# Patient Record
Sex: MALE | Race: WHITE | NOT HISPANIC OR LATINO | URBAN - NONMETROPOLITAN AREA
[De-identification: names, ages, dates, MRNs, and addresses within clinical notes are randomized per-mention and may not be internally consistent; named-entity substitution may affect disease eponyms.]

---

## 2018-08-28 NOTE — PROCEDURE NOTE: CLINICAL
Prior to treatment, the risks/benefits/alternatives were discussed. The patient wished to proceed with procedure. Local anesthetic was given. The eyelid was prepped and draped for procedure. The lesion was incised and removed. The wound was repaired with sutures. Patient tolerated procedure well. There were no complications. Post procedure instructions given.

## 2019-05-24 NOTE — PATIENT DISCUSSION
IRIS NEVI OU - NO PERSONAL HX MELANOMA. +FH (mom) OF CHOROIDAL MELANOMA. DISCUSSED RISK OF TRANSFORMATION OF IRIS NEVUS TO MELANOMA. FOLLOW CLOSELY.  PHOTOS UNDILATED AT FU

## 2019-05-24 NOTE — PATIENT DISCUSSION
OCULAR MIGRAINE WITH HEADACHE (SCINTILLATING SCOTOMA):  AVOID PRECIPITATING TRIGGERS. CONSIDER 400MG-500MG MAGNESIUM SUPPLEMENTS AT BEDTIME. CALL BACK IMMEDIATELY FOR ANY WORSENING SYMPTOMS. STABLE PER PATIENT. FOLLOW.

## 2021-09-27 ENCOUNTER — IMPORTED ENCOUNTER (OUTPATIENT)
Dept: URBAN - NONMETROPOLITAN AREA CLINIC 1 | Facility: CLINIC | Age: 56
End: 2021-09-27

## 2021-09-27 PROBLEM — H52.4: Noted: 2021-09-27

## 2021-09-27 PROBLEM — H25.813: Noted: 2021-09-27

## 2021-09-27 PROBLEM — H25.812: Noted: 2021-09-27

## 2021-09-27 PROCEDURE — 92015 DETERMINE REFRACTIVE STATE: CPT

## 2021-09-27 PROCEDURE — 92004 COMPRE OPH EXAM NEW PT 1/>: CPT

## 2022-04-10 ASSESSMENT — TONOMETRY
OD_IOP_MMHG: 15
OS_IOP_MMHG: 15

## 2022-04-10 ASSESSMENT — VISUAL ACUITY
OD_CC: 20/20
OS_CC: 20/20-1
OU_SC: J16

## 2022-09-26 ENCOUNTER — ESTABLISHED PATIENT (OUTPATIENT)
Dept: RURAL CLINIC 2 | Facility: CLINIC | Age: 57
End: 2022-09-26

## 2022-09-26 DIAGNOSIS — H52.223: ICD-10-CM

## 2022-09-26 DIAGNOSIS — H25.813: ICD-10-CM

## 2022-09-26 DIAGNOSIS — H52.4: ICD-10-CM

## 2022-09-26 PROCEDURE — 92014 COMPRE OPH EXAM EST PT 1/>: CPT

## 2022-09-26 PROCEDURE — 92015 DETERMINE REFRACTIVE STATE: CPT

## 2022-09-26 ASSESSMENT — VISUAL ACUITY
OU_SC: 20/70
OU_SC: 20/20
OS_SC: 20/30
OD_SC: 20/20

## 2022-09-26 ASSESSMENT — TONOMETRY
OS_IOP_MMHG: 18
OD_IOP_MMHG: 18

## 2023-09-27 ENCOUNTER — ESTABLISHED PATIENT (OUTPATIENT)
Dept: RURAL CLINIC 2 | Facility: CLINIC | Age: 58
End: 2023-09-27

## 2023-09-27 DIAGNOSIS — H25.813: ICD-10-CM

## 2023-09-27 DIAGNOSIS — H52.4: ICD-10-CM

## 2023-09-27 DIAGNOSIS — H52.223: ICD-10-CM

## 2023-09-27 DIAGNOSIS — H52.03: ICD-10-CM

## 2023-09-27 DIAGNOSIS — H35.00: ICD-10-CM

## 2023-09-27 DIAGNOSIS — H35.413: ICD-10-CM

## 2023-09-27 PROCEDURE — 92014 COMPRE OPH EXAM EST PT 1/>: CPT

## 2023-09-27 PROCEDURE — 92015 DETERMINE REFRACTIVE STATE: CPT

## 2023-09-27 ASSESSMENT — TONOMETRY
OD_IOP_MMHG: 22
OS_IOP_MMHG: 23

## 2023-09-27 ASSESSMENT — VISUAL ACUITY
OD_SC: 20/30
OS_SC: 20/30

## 2024-03-27 ENCOUNTER — ESTABLISHED PATIENT (OUTPATIENT)
Dept: RURAL CLINIC 2 | Facility: CLINIC | Age: 59
End: 2024-03-27

## 2024-03-27 DIAGNOSIS — H25.813: ICD-10-CM

## 2024-03-27 DIAGNOSIS — H35.413: ICD-10-CM

## 2024-03-27 DIAGNOSIS — H34.8312: ICD-10-CM

## 2024-03-27 PROCEDURE — 92134 CPTRZ OPH DX IMG PST SGM RTA: CPT

## 2024-03-27 PROCEDURE — 99214 OFFICE O/P EST MOD 30 MIN: CPT

## 2024-03-27 ASSESSMENT — VISUAL ACUITY
OS_PH: 20/30
OS_SC: 20/50-1
OD_SC: 20/60-1
OD_PH: 20/25-2

## 2024-03-27 ASSESSMENT — TONOMETRY
OD_IOP_MMHG: 21
OS_IOP_MMHG: 22

## 2024-06-13 ENCOUNTER — CONSULTATION/EVALUATION (OUTPATIENT)
Dept: URBAN - NONMETROPOLITAN AREA CLINIC 4 | Facility: CLINIC | Age: 59
End: 2024-06-13

## 2024-06-13 DIAGNOSIS — H25.813: ICD-10-CM

## 2024-06-13 PROCEDURE — 92025 CPTRIZED CORNEAL TOPOGRAPHY: CPT | Mod: NC

## 2024-06-13 PROCEDURE — 92134 CPTRZ OPH DX IMG PST SGM RTA: CPT | Mod: NC

## 2024-06-13 PROCEDURE — 92136 OPHTHALMIC BIOMETRY: CPT

## 2024-06-13 PROCEDURE — 99214 OFFICE O/P EST MOD 30 MIN: CPT

## 2024-06-13 ASSESSMENT — VISUAL ACUITY
OS_AM: 20/40
OS_PH: 20/25-2
OS_BAT: 20/40 WITH BEST CORRECTED VISION
OS_SC: 20/40
OD_BAT: 20/400 WITH BEST CORRECTED VISION
OD_SC: 20/400
OD_SC: 20/100
OD_PAM: 20/70
OS_SC: 20/200
OD_PH: 20/30

## 2024-06-13 ASSESSMENT — TONOMETRY
OD_IOP_MMHG: 16
OS_IOP_MMHG: 16

## 2024-06-27 ENCOUNTER — PRE-OP/H&P (OUTPATIENT)
Dept: RURAL CLINIC 1 | Facility: CLINIC | Age: 59
End: 2024-06-27

## 2024-06-27 VITALS
SYSTOLIC BLOOD PRESSURE: 154 MMHG | HEIGHT: 77 IN | WEIGHT: 225 LBS | DIASTOLIC BLOOD PRESSURE: 96 MMHG | HEART RATE: 86 BPM | BODY MASS INDEX: 26.57 KG/M2

## 2024-06-27 DIAGNOSIS — Z01.818: ICD-10-CM

## 2024-06-27 PROCEDURE — 99499 UNLISTED E&M SERVICE: CPT

## 2024-07-08 ENCOUNTER — SURGERY/PROCEDURE (OUTPATIENT)
Dept: URBAN - METROPOLITAN AREA SURGERY 3 | Facility: SURGERY | Age: 59
End: 2024-07-08

## 2024-07-08 DIAGNOSIS — H25.811: ICD-10-CM

## 2024-07-08 PROCEDURE — 99199PAV PROF ADVANCED VISION PACKAGE

## 2024-07-08 PROCEDURE — 66999LNSR LENSAR LASER FOR CAT SX

## 2024-07-08 PROCEDURE — 65772LRI LRI DURING CAT SX

## 2024-07-08 PROCEDURE — 66984AV REMOVE CATARACT, INSERT ADVANCED LENS

## 2024-07-09 ENCOUNTER — POST-OP (OUTPATIENT)
Dept: RURAL CLINIC 2 | Facility: CLINIC | Age: 59
End: 2024-07-09

## 2024-07-09 DIAGNOSIS — Z96.1: ICD-10-CM

## 2024-07-09 PROCEDURE — 99024 POSTOP FOLLOW-UP VISIT: CPT

## 2024-07-09 ASSESSMENT — TONOMETRY
OD_IOP_MMHG: 15
OS_IOP_MMHG: 15

## 2024-07-09 ASSESSMENT — VISUAL ACUITY
OS_SC: 20/40-1
OD_SC: 20/25+3

## 2024-07-16 ENCOUNTER — POST OP/EVAL OF SECOND EYE (OUTPATIENT)
Dept: RURAL CLINIC 2 | Facility: CLINIC | Age: 59
End: 2024-07-16

## 2024-07-16 DIAGNOSIS — Z96.1: ICD-10-CM

## 2024-07-16 PROCEDURE — 99024 POSTOP FOLLOW-UP VISIT: CPT

## 2024-07-16 ASSESSMENT — VISUAL ACUITY
OD_SC: 20/20
OS_SC: 20/200
OS_SC: 20/40
OS_AM: 20/40
OS_BAT: 20/40

## 2024-07-16 ASSESSMENT — TONOMETRY
OS_IOP_MMHG: 19
OD_IOP_MMHG: 19

## 2024-07-22 ENCOUNTER — SURGERY/PROCEDURE (OUTPATIENT)
Dept: URBAN - METROPOLITAN AREA SURGERY 3 | Facility: SURGERY | Age: 59
End: 2024-07-22

## 2024-07-22 DIAGNOSIS — H25.812: ICD-10-CM

## 2024-07-22 PROCEDURE — 65772LRI LRI DURING CAT SX

## 2024-07-22 PROCEDURE — 66999LNSR LENSAR LASER FOR CAT SX

## 2024-07-22 PROCEDURE — 66984AV REMOVE CATARACT, INSERT ADVANCED LENS: Mod: 79,LT

## 2024-07-22 PROCEDURE — 99199PAV PROF ADVANCED VISION PACKAGE

## 2024-07-23 ENCOUNTER — POST-OP (OUTPATIENT)
Dept: RURAL CLINIC 2 | Facility: CLINIC | Age: 59
End: 2024-07-23

## 2024-07-23 DIAGNOSIS — Z96.1: ICD-10-CM

## 2024-07-23 PROCEDURE — 99024 POSTOP FOLLOW-UP VISIT: CPT

## 2024-07-23 ASSESSMENT — VISUAL ACUITY
OS_SC: 20/25+
OD_SC: 20/20

## 2024-07-23 ASSESSMENT — TONOMETRY
OD_IOP_MMHG: 21
OS_IOP_MMHG: 32

## 2024-07-30 ENCOUNTER — POST-OP (OUTPATIENT)
Dept: RURAL CLINIC 2 | Facility: CLINIC | Age: 59
End: 2024-07-30

## 2024-07-30 DIAGNOSIS — Z96.1: ICD-10-CM

## 2024-07-30 PROCEDURE — 99024 POSTOP FOLLOW-UP VISIT: CPT

## 2024-07-30 ASSESSMENT — TONOMETRY
OD_IOP_MMHG: 19
OS_IOP_MMHG: 21

## 2024-07-30 ASSESSMENT — VISUAL ACUITY
OD_SC: 20/20
OS_SC: 20/25

## 2024-08-13 ENCOUNTER — POST-OP (OUTPATIENT)
Dept: RURAL CLINIC 2 | Facility: CLINIC | Age: 59
End: 2024-08-13

## 2024-08-13 DIAGNOSIS — Z96.1: ICD-10-CM

## 2024-08-13 PROCEDURE — 99024 POSTOP FOLLOW-UP VISIT: CPT

## 2024-08-13 ASSESSMENT — TONOMETRY
OS_IOP_MMHG: 19
OD_IOP_MMHG: 19

## 2024-08-13 ASSESSMENT — VISUAL ACUITY
OU_SC: 20/20
OU_SC: 20/30
OS_SC: 20/25
OD_SC: 20/25

## 2025-02-17 ENCOUNTER — COMPREHENSIVE EXAM (OUTPATIENT)
Age: 60
End: 2025-02-17

## 2025-02-17 DIAGNOSIS — H34.8312: ICD-10-CM

## 2025-02-17 DIAGNOSIS — H35.413: ICD-10-CM

## 2025-02-17 DIAGNOSIS — Z96.1: ICD-10-CM

## 2025-02-17 PROCEDURE — 92134 CPTRZ OPH DX IMG PST SGM RTA: CPT

## 2025-02-17 PROCEDURE — 99214 OFFICE O/P EST MOD 30 MIN: CPT
